# Patient Record
(demographics unavailable — no encounter records)

---

## 2024-12-30 NOTE — PHYSICAL EXAM
[Well Developed] : well developed [Well Nourished] : well nourished [No Acute Distress] : no acute distress [Normal Conjunctiva] : normal conjunctiva [Normal Venous Pressure] : normal venous pressure [No Carotid Bruit] : no carotid bruit [Normal S1, S2] : normal S1, S2 [No Murmur] : no murmur [No Rub] : no rub [No Gallop] : no gallop [Clear Lung Fields] : clear lung fields [Good Air Entry] : good air entry [No Respiratory Distress] : no respiratory distress  [Soft] : abdomen soft [Non Tender] : non-tender [No Masses/organomegaly] : no masses/organomegaly [Normal Bowel Sounds] : normal bowel sounds [Normal Gait] : normal gait [No Cyanosis] : no cyanosis [No Clubbing] : no clubbing [No Varicosities] : no varicosities [Moves all extremities] : moves all extremities [No Focal Deficits] : no focal deficits [Normal Speech] : normal speech [Alert and Oriented] : alert and oriented [Normal memory] : normal memory [de-identified] : Patient has trace LE edema on left.

## 2024-12-30 NOTE — HISTORY OF PRESENT ILLNESS
[FreeTextEntry1] : Please refer to NP note below.    72-year-old male with HTN, DM, HLD, Kidney Cancer s/p surgery in 1990, 2012, and Prostate CA s/p surgery 2023, presents for cardiac evaluation.  Pt was referred by PCP for checkup. Pt reports rare dizziness due to elevated BP.  Patient reports BARBOSA.  Pt denies CP, palpitations.  Pt denies h/o syncope. He quit smoking for 30 years and had 10-year smoking history.  Pt is on Losartan 25mg, Nifedipine 30 mg, Atorvastatin 10 mg (new), Farxiga 5 mg (new).  His home BP is around 130/80. Today's /72 P 75.  Exam showed trace LE edema on left.  ECG showed NSR, no STTw changes.  Recent Cr 1.43, A1C 7.2, .  I advised patient to undergo an echocardiogram and a treadmill stress test.